# Patient Record
Sex: FEMALE | Race: BLACK OR AFRICAN AMERICAN | NOT HISPANIC OR LATINO | Employment: UNEMPLOYED | URBAN - METROPOLITAN AREA
[De-identification: names, ages, dates, MRNs, and addresses within clinical notes are randomized per-mention and may not be internally consistent; named-entity substitution may affect disease eponyms.]

---

## 2023-06-05 ENCOUNTER — APPOINTMENT (OUTPATIENT)
Dept: LAB | Facility: LAB | Age: 33
End: 2023-06-05
Payer: COMMERCIAL

## 2023-06-05 LAB
ABO GROUP (TYPE) IN BLOOD: NORMAL
ANTIBODY SCREEN: NORMAL
ERYTHROCYTE DISTRIBUTION WIDTH (RATIO) BY AUTOMATED COUNT: 13.4 % (ref 11.5–14.5)
ERYTHROCYTE MEAN CORPUSCULAR HEMOGLOBIN CONCENTRATION (G/DL) BY AUTOMATED: 35.3 G/DL (ref 32–36)
ERYTHROCYTE MEAN CORPUSCULAR VOLUME (FL) BY AUTOMATED COUNT: 88 FL (ref 80–100)
ERYTHROCYTES (10*6/UL) IN BLOOD BY AUTOMATED COUNT: 4.07 X10E12/L (ref 4–5.2)
HEMATOCRIT (%) IN BLOOD BY AUTOMATED COUNT: 36 % (ref 36–46)
HEMOGLOBIN (G/DL) IN BLOOD: 12.7 G/DL (ref 12–16)
HEPATITIS B VIRUS SURFACE AG PRESENCE IN SERUM: NONREACTIVE
HEPATITIS C VIRUS AB PRESENCE IN SERUM: NONREACTIVE
HIV 1/ 2 AG/AB SCREEN: NONREACTIVE
LEUKOCYTES (10*3/UL) IN BLOOD BY AUTOMATED COUNT: 7.1 X10E9/L (ref 4.4–11.3)
NRBC (PER 100 WBCS) BY AUTOMATED COUNT: 0 /100 WBC (ref 0–0)
PLATELETS (10*3/UL) IN BLOOD AUTOMATED COUNT: 236 X10E9/L (ref 150–450)
REFLEX ADDED, ANEMIA PANEL: NORMAL
RH FACTOR: NORMAL
RUBELLA VIRUS IGG AB: POSITIVE
SYPHILIS TOTAL AB: NONREACTIVE

## 2023-06-06 LAB
CHLAMYDIA TRACH., AMPLIFIED: NEGATIVE
N. GONORRHEA, AMPLIFIED: NEGATIVE
TRICHOMONAS VAGINALIS: NEGATIVE
URINE CULTURE: NORMAL

## 2023-06-08 LAB
HEMOGLOBIN A2: 2.9 %
HEMOGLOBIN A: 57.4 %
HEMOGLOBIN F: 0.8 %
HEMOGLOBIN IDENTIFICATION INTERPRETATION: NORMAL
HEMOGLOBIN S: 38.9 %
MISCELLANEUOUS TEST RESULT: NORMAL
NAME OF SENDOUT TEST: NORMAL
PATH REVIEW-HGB IDENTIFICATION: NORMAL

## 2023-09-22 ENCOUNTER — PATIENT OUTREACH (OUTPATIENT)
Dept: CARE COORDINATION | Facility: CLINIC | Age: 33
End: 2023-09-22

## 2023-09-22 ASSESSMENT — EDINBURGH POSTNATAL DEPRESSION SCALE (EPDS)
I HAVE BEEN ANXIOUS OR WORRIED FOR NO GOOD REASON: NO, NOT AT ALL
THINGS HAVE BEEN GETTING ON TOP OF ME: NO, MOST OF THE TIME I HAVE COPED QUITE WELL
TOTAL SCORE: 3
I HAVE FELT SAD OR MISERABLE: NO, NOT AT ALL
I HAVE BEEN SO UNHAPPY THAT I HAVE BEEN CRYING: NO, NEVER
I HAVE BEEN SO UNHAPPY THAT I HAVE HAD DIFFICULTY SLEEPING: NOT AT ALL
I HAVE LOOKED FORWARD WITH ENJOYMENT TO THINGS: AS MUCH AS I EVER DID
I HAVE BLAMED MYSELF UNNECESSARILY WHEN THINGS WENT WRONG: NOT VERY OFTEN
I HAVE BEEN ABLE TO LAUGH AND SEE THE FUNNY SIDE OF THINGS: AS MUCH AS I ALWAYS COULD
I HAVE FELT SCARED OR PANICKY FOR NO GOOD REASON: NO, NOT MUCH
THE THOUGHT OF HARMING MYSELF HAS OCCURRED TO ME: NEVER

## 2023-09-25 ENCOUNTER — LAB (OUTPATIENT)
Dept: LAB | Facility: LAB | Age: 33
End: 2023-09-25
Payer: COMMERCIAL

## 2023-09-25 LAB
ERYTHROCYTE DISTRIBUTION WIDTH (RATIO) BY AUTOMATED COUNT: 12.9 % (ref 11.5–14.5)
ERYTHROCYTE MEAN CORPUSCULAR HEMOGLOBIN CONCENTRATION (G/DL) BY AUTOMATED: 33.6 G/DL (ref 32–36)
ERYTHROCYTE MEAN CORPUSCULAR VOLUME (FL) BY AUTOMATED COUNT: 96 FL (ref 80–100)
ERYTHROCYTES (10*6/UL) IN BLOOD BY AUTOMATED COUNT: 3.42 X10E12/L (ref 4–5.2)
GLUCOSE, 1 HR SCREEN, PREG: 63 MG/DL
HEMATOCRIT (%) IN BLOOD BY AUTOMATED COUNT: 33 % (ref 36–46)
HEMOGLOBIN (G/DL) IN BLOOD: 11.1 G/DL (ref 12–16)
LEUKOCYTES (10*3/UL) IN BLOOD BY AUTOMATED COUNT: 9.2 X10E9/L (ref 4.4–11.3)
NRBC (PER 100 WBCS) BY AUTOMATED COUNT: 0 /100 WBC (ref 0–0)
PLATELETS (10*3/UL) IN BLOOD AUTOMATED COUNT: 181 X10E9/L (ref 150–450)
REFLEX ADDED, ANEMIA PANEL: ABNORMAL
SYPHILIS TOTAL AB: NONREACTIVE

## 2023-10-09 ENCOUNTER — PATIENT OUTREACH (OUTPATIENT)
Dept: CARE COORDINATION | Facility: CLINIC | Age: 33
End: 2023-10-09
Payer: COMMERCIAL

## 2023-10-23 ENCOUNTER — ROUTINE PRENATAL (OUTPATIENT)
Dept: OBSTETRICS AND GYNECOLOGY | Facility: HOSPITAL | Age: 33
End: 2023-10-23
Payer: COMMERCIAL

## 2023-10-23 VITALS — SYSTOLIC BLOOD PRESSURE: 118 MMHG | DIASTOLIC BLOOD PRESSURE: 76 MMHG | WEIGHT: 197 LBS | BODY MASS INDEX: 31.8 KG/M2

## 2023-10-23 DIAGNOSIS — Z3A.29 29 WEEKS GESTATION OF PREGNANCY (HHS-HCC): ICD-10-CM

## 2023-10-23 DIAGNOSIS — Z23 NEED FOR TDAP VACCINATION: ICD-10-CM

## 2023-10-23 PROCEDURE — 99213 OFFICE O/P EST LOW 20 MIN: CPT

## 2023-10-23 PROCEDURE — 90471 IMMUNIZATION ADMIN: CPT

## 2023-10-23 PROCEDURE — 99213 OFFICE O/P EST LOW 20 MIN: CPT | Mod: TH,25

## 2023-10-23 PROCEDURE — 90472 IMMUNIZATION ADMIN EACH ADD: CPT

## 2023-10-23 RX ORDER — PRENATAL VIT 75/IRON/FOLIC/OM3 28-800-440
COMBINATION PACKAGE (EA) ORAL
COMMUNITY

## 2023-10-23 ASSESSMENT — ENCOUNTER SYMPTOMS
GASTROINTESTINAL NEGATIVE: 0
NEUROLOGICAL NEGATIVE: 0
ENDOCRINE NEGATIVE: 0
MUSCULOSKELETAL NEGATIVE: 0
EYES NEGATIVE: 0
HEMATOLOGIC/LYMPHATIC NEGATIVE: 0
RESPIRATORY NEGATIVE: 0
PSYCHIATRIC NEGATIVE: 0
CONSTITUTIONAL NEGATIVE: 0
CARDIOVASCULAR NEGATIVE: 0
ALLERGIC/IMMUNOLOGIC NEGATIVE: 0

## 2023-10-23 NOTE — PROGRESS NOTES
Assessment/Plan   Problem List Items Addressed This Visit    None  Visit Diagnoses         Codes    Need for Tdap vaccination     Z23    Relevant Orders    Tdap vaccine, age 7 years and older  (BOOSTRIX)    29 weeks gestation of pregnancy     Z3A.29    Relevant Orders    Flu vaccine (IIV4) age 6 months and greater, preservative free          No other concerns today  Reviewed s/sx of PTL, warning signs, fetal movement counts, and when to call provider  Follow up in 2 week for a routine prenatal visit.    Caryn Mercado, BERTHA-LEANDER    Subjective     Nathalie Mendez is a 32 y.o.  at 29w4d with a working estimated date of delivery of 2024, by Last Menstrual Period who presents for a routine prenatal visit. She denies vaginal bleeding, leakage of fluid, decreased fetal movements, or contractions.    Her pregnancy is complicated by:  Adjustment disorder with mixed anxiety and depressed mood 2023 05:57 PM - AS   Segmental and somatic dysfunction of lumbar region 2023 09:08 AM - BB   Segmental and somatic dysfunction of pelvic region 2023 09:08 AM - BB   Segmental and somatic dysfunction of thoracic region 2023 09:08 AM - BB   Sickle cell trait 2015 04:00 PM - GM   Same FOB 2019 02:48 PM - DL   FOB SC Trait (-) 2015 01:49 PM - GM    Objective   Physical Exam:   Weight: 89.4 kg (197 lb)  TW.7 kg (39 lb)  Expected Total Weight Gain: 7 kg (15 lb)-11.5 kg (25 lb)   Pregravid BMI: 25.51  BP: 118/76  Fetal Heart Rate: 135 Fundal Height (cm): 30 cm             Postpartum Depression: Low Risk  (2023)    [place]  Depression Scale     Last EPDS Total Score: 3     Last EPDS Self Harm Result: Never        Prenatal Labs  Lab Results   Component Value Date    HGB 11.1 (L) 2023    HCT 33.0 (L) 2023     2023    ABO A 2023    LABRH POS 2023    NEISSGONOAMP NEGATIVE 2023    CHLAMTRACAMP NEGATIVE 2023    SYPHT  NONREACTIVE 09/25/2023    HEPBSAG NONREACTIVE 06/05/2023    HIV1X2 NONREACTIVE 06/05/2023    URINECULTURE MIXED URETHRAL RHONDA. 06/05/2023     Lab Results   Component Value Date    GLUC1P 63 09/25/2023

## 2023-11-08 ENCOUNTER — ROUTINE PRENATAL (OUTPATIENT)
Dept: OBSTETRICS AND GYNECOLOGY | Facility: HOSPITAL | Age: 33
End: 2023-11-08
Payer: COMMERCIAL

## 2023-11-08 VITALS — SYSTOLIC BLOOD PRESSURE: 114 MMHG | WEIGHT: 200 LBS | BODY MASS INDEX: 32.28 KG/M2 | DIASTOLIC BLOOD PRESSURE: 75 MMHG

## 2023-11-08 DIAGNOSIS — Z3A.29 29 WEEKS GESTATION OF PREGNANCY (HHS-HCC): ICD-10-CM

## 2023-11-08 DIAGNOSIS — Z3A.31 31 WEEKS GESTATION OF PREGNANCY (HHS-HCC): Primary | ICD-10-CM

## 2023-11-08 PROCEDURE — 99213 OFFICE O/P EST LOW 20 MIN: CPT | Mod: TH

## 2023-11-08 PROCEDURE — 99213 OFFICE O/P EST LOW 20 MIN: CPT

## 2023-11-08 ASSESSMENT — ENCOUNTER SYMPTOMS
NEUROLOGICAL NEGATIVE: 0
GASTROINTESTINAL NEGATIVE: 0
HEMATOLOGIC/LYMPHATIC NEGATIVE: 0
PSYCHIATRIC NEGATIVE: 0
MUSCULOSKELETAL NEGATIVE: 0
RESPIRATORY NEGATIVE: 0
ENDOCRINE NEGATIVE: 0
CARDIOVASCULAR NEGATIVE: 0
CONSTITUTIONAL NEGATIVE: 0
ALLERGIC/IMMUNOLOGIC NEGATIVE: 0
EYES NEGATIVE: 0

## 2023-11-08 NOTE — PROGRESS NOTES
Assessment/Plan   Problem List Items Addressed This Visit    None  Visit Diagnoses         Codes    31 weeks gestation of pregnancy    -  Primary Z3A.31    Relevant Orders    Follow Up In Obstetrics   No concerns today  Reviewed s/sx of PTL, warning signs, fetal movement counts, and when to call provider  Follow up in 2 weeks for a routine prenatal visit.    Caryn Mercado, BERTHA-LEANDER    Subjective     Nathalie Mendez is a 32 y.o.  at 31w6d with a working estimated date of delivery of 2024, by Last Menstrual Period who presents for a routine prenatal visit. She denies vaginal bleeding, leakage of fluid, decreased fetal movements, or contractions.    Her pregnancy is complicated by:  Sickle cell trait  Anxiety/depression     Objective   Physical Exam:   Weight: 90.7 kg (200 lb)  TW.1 kg (42 lb)  Expected Total Weight Gain: 7 kg (15 lb)-11.5 kg (25 lb)   Pregravid BMI: 25.51  BP: 114/75  Fetal Heart Rate: 140 Fundal Height (cm): 31 cm Presentation: Vertex           Postpartum Depression: Low Risk  (2023)    [place]  Depression Scale     Last EPDS Total Score: 3     Last EPDS Self Harm Result: Never        Prenatal Labs  Lab Results   Component Value Date    HGB 11.1 (L) 2023    HCT 33.0 (L) 2023     2023    ABO A 2023    LABRH POS 2023    NEISSGONOAMP NEGATIVE 2023    CHLAMTRACAMP NEGATIVE 2023    SYPHT NONREACTIVE 2023    HEPBSAG NONREACTIVE 2023    HIV1X2 NONREACTIVE 2023    URINECULTURE MIXED URETHRAL RHONDA. 2023     Lab Results   Component Value Date    GLUC1P 63 2023

## 2023-11-22 ENCOUNTER — ROUTINE PRENATAL (OUTPATIENT)
Dept: OBSTETRICS AND GYNECOLOGY | Facility: HOSPITAL | Age: 33
End: 2023-11-22
Payer: COMMERCIAL

## 2023-11-22 VITALS — BODY MASS INDEX: 33.25 KG/M2 | DIASTOLIC BLOOD PRESSURE: 68 MMHG | SYSTOLIC BLOOD PRESSURE: 116 MMHG | WEIGHT: 206 LBS

## 2023-11-22 DIAGNOSIS — Z3A.33 33 WEEKS GESTATION OF PREGNANCY (HHS-HCC): Primary | ICD-10-CM

## 2023-11-22 DIAGNOSIS — Z3A.31 31 WEEKS GESTATION OF PREGNANCY (HHS-HCC): ICD-10-CM

## 2023-11-22 PROCEDURE — 99213 OFFICE O/P EST LOW 20 MIN: CPT | Mod: TH

## 2023-11-22 PROCEDURE — 99213 OFFICE O/P EST LOW 20 MIN: CPT

## 2023-11-22 ASSESSMENT — ENCOUNTER SYMPTOMS
GASTROINTESTINAL NEGATIVE: 0
MUSCULOSKELETAL NEGATIVE: 0
RESPIRATORY NEGATIVE: 0
CONSTITUTIONAL NEGATIVE: 0
PSYCHIATRIC NEGATIVE: 0
EYES NEGATIVE: 0
ALLERGIC/IMMUNOLOGIC NEGATIVE: 0
ENDOCRINE NEGATIVE: 0
NEUROLOGICAL NEGATIVE: 0
HEMATOLOGIC/LYMPHATIC NEGATIVE: 0
CARDIOVASCULAR NEGATIVE: 0

## 2023-11-22 NOTE — PROGRESS NOTES
Assessment/Plan   Problem List Items Addressed This Visit    None  Visit Diagnoses         Codes    33 weeks gestation of pregnancy    -  Primary Z3A.33    Relevant Orders    Follow Up In Obstetrics    US MAC OB imaging order     Discussed routine GBS screening, to be completed next visit   surveillance weekly for BMI  No other concerns today  Reviewed s/sx of PTL, warning signs, fetal movement counts, and when to call provider  Follow up in 2 weeks for a routine prenatal visit.    Caryn Mercado, BERTHA-LEANDER    Subjective     Nathalie Mendez is a 33 y.o.  at 33w6d with a working estimated date of delivery of 2024, by Last Menstrual Period who presents for a routine prenatal visit. She denies vaginal bleeding, leakage of fluid, decreased fetal movements, or contractions.    Her pregnancy is complicated by:  Pregnancy Problems (from 23 to present)       No problems associated with this episode.             Objective   Physical Exam:   Weight: 93.4 kg (206 lb)  TW.8 kg (48 lb)  Expected Total Weight Gain: 7 kg (15 lb)-11.5 kg (25 lb)   Pregravid BMI: 25.51  BP: 116/68  Fetal Heart Rate: 135 Fundal Height (cm): 34 cm Presentation: Vertex           Postpartum Depression: Low Risk  (2023)    [place]  Depression Scale     Last EPDS Total Score: 3     Last EPDS Self Harm Result: Never        Prenatal Labs  Lab Results   Component Value Date    HGB 11.1 (L) 2023    HCT 33.0 (L) 2023     2023    ABO A 2023    LABRH POS 2023    NEISSGONOAMP NEGATIVE 2023    CHLAMTRACAMP NEGATIVE 2023    SYPHT NONREACTIVE 2023    HEPBSAG NONREACTIVE 2023    HIV1X2 NONREACTIVE 2023    URINECULTURE MIXED URETHRAL RHONDA. 2023     Lab Results   Component Value Date    GLUC1P 63 2023

## 2023-12-07 ENCOUNTER — APPOINTMENT (OUTPATIENT)
Dept: NUTRITION | Facility: HOSPITAL | Age: 33
End: 2023-12-07
Payer: COMMERCIAL

## 2023-12-11 ENCOUNTER — ROUTINE PRENATAL (OUTPATIENT)
Dept: OBSTETRICS AND GYNECOLOGY | Facility: HOSPITAL | Age: 33
End: 2023-12-11
Payer: COMMERCIAL

## 2023-12-11 ENCOUNTER — HOSPITAL ENCOUNTER (OUTPATIENT)
Dept: RADIOLOGY | Facility: HOSPITAL | Age: 33
Discharge: HOME | End: 2023-12-11
Payer: COMMERCIAL

## 2023-12-11 VITALS — DIASTOLIC BLOOD PRESSURE: 76 MMHG | SYSTOLIC BLOOD PRESSURE: 117 MMHG

## 2023-12-11 DIAGNOSIS — Z3A.33 33 WEEKS GESTATION OF PREGNANCY (HHS-HCC): ICD-10-CM

## 2023-12-11 DIAGNOSIS — Z3A.36 36 WEEKS GESTATION OF PREGNANCY (HHS-HCC): Primary | ICD-10-CM

## 2023-12-11 PROCEDURE — 99213 OFFICE O/P EST LOW 20 MIN: CPT

## 2023-12-11 PROCEDURE — 87081 CULTURE SCREEN ONLY: CPT

## 2023-12-11 PROCEDURE — 99213 OFFICE O/P EST LOW 20 MIN: CPT | Mod: TH

## 2023-12-11 PROCEDURE — 76816 OB US FOLLOW-UP PER FETUS: CPT

## 2023-12-11 PROCEDURE — 76816 OB US FOLLOW-UP PER FETUS: CPT | Performed by: OBSTETRICS & GYNECOLOGY

## 2023-12-11 NOTE — PROGRESS NOTES
Assessment/Plan   Problem List Items Addressed This Visit    None  Visit Diagnoses         Codes    36 weeks gestation of pregnancy    -  Primary Z3A.36    Relevant Orders    Group B Streptococcus (GBS) Prenatal Screen, Culture    Follow Up In Obstetrics     Discussed routine GBS screening, to be completed today  No other concerns today  Reviewed s/sx of PTL, warning signs, fetal movement counts, and when to call provider  Follow up in 1 week for a routine prenatal visit.    Caryn Mercado, BERTHA-LEANDER    Subjective     Nathalie Mendez is a 33 y.o.  at 36w4d with a working estimated date of delivery of 2024, by Last Menstrual Period who presents for a routine prenatal visit. She denies vaginal bleeding, leakage of fluid, decreased fetal movements, or contractions.    Her pregnancy is complicated by:  Sickle cell trait    Objective   Physical Exam:      TW.8 kg (48 lb)  Expected Total Weight Gain: 7 kg (15 lb)-11.5 kg (25 lb)   Pregravid BMI: 25.51  BP: 117/76  Fetal Heart Rate: 145 Fundal Height (cm): 37 cm Presentation: Vertex  Dilation: 1 Effacement (%): 50 Fetal Station: -3    Postpartum Depression: Low Risk  (2023)    [place]  Depression Scale     Last EPDS Total Score: 3     Last EPDS Self Harm Result: Never        Prenatal Labs  Lab Results   Component Value Date    HGB 11.1 (L) 2023    HCT 33.0 (L) 2023     2023    ABO A 2023    LABRH POS 2023    NEISSGONOAMP NEGATIVE 2023    CHLAMTRACAMP NEGATIVE 2023    SYPHT NONREACTIVE 2023    HEPBSAG NONREACTIVE 2023    HIV1X2 NONREACTIVE 2023    URINECULTURE MIXED URETHRAL RHONDA. 2023     Lab Results   Component Value Date    GLUC1P 63 2023

## 2023-12-13 LAB — GP B STREP GENITAL QL CULT: ABNORMAL

## 2023-12-15 ENCOUNTER — APPOINTMENT (OUTPATIENT)
Dept: NUTRITION | Facility: HOSPITAL | Age: 33
End: 2023-12-15
Payer: COMMERCIAL

## 2023-12-20 ENCOUNTER — ROUTINE PRENATAL (OUTPATIENT)
Dept: OBSTETRICS AND GYNECOLOGY | Facility: HOSPITAL | Age: 33
End: 2023-12-20
Payer: COMMERCIAL

## 2023-12-20 VITALS — SYSTOLIC BLOOD PRESSURE: 123 MMHG | DIASTOLIC BLOOD PRESSURE: 78 MMHG | BODY MASS INDEX: 34.06 KG/M2 | WEIGHT: 211 LBS

## 2023-12-20 DIAGNOSIS — Z3A.36 36 WEEKS GESTATION OF PREGNANCY (HHS-HCC): ICD-10-CM

## 2023-12-20 DIAGNOSIS — Z3A.37 37 WEEKS GESTATION OF PREGNANCY (HHS-HCC): Primary | ICD-10-CM

## 2023-12-20 PROCEDURE — 99213 OFFICE O/P EST LOW 20 MIN: CPT | Mod: TH

## 2023-12-20 PROCEDURE — 99213 OFFICE O/P EST LOW 20 MIN: CPT

## 2023-12-20 NOTE — PROGRESS NOTES
Assessment/Plan   Problem List Items Addressed This Visit    None  Visit Diagnoses         Codes    37 weeks gestation of pregnancy    -  Primary Z3A.37    Relevant Orders    Follow Up In Obstetrics     Complaints of increasing pelvic pressure and contractions are becoming more consistent; SVE unchanged.  No other concerns today  Reviewed s/sx of labor, warning signs, fetal movement counts, and when to call provider  Follow up in 1 week for a routine prenatal visit.    Caryn Mercado, APRN-CN    Subjective     Nathalie Mendez is a 33 y.o.  at 37w6d with a working estimated date of delivery of 2024, by Last Menstrual Period who presents for a routine prenatal visit. She denies vaginal bleeding, leakage of fluid, decreased fetal movements, or contractions.    Her pregnancy is complicated by:  Pregnancy Problems (from 23 to present)       No problems associated with this episode.             Objective   Physical Exam:   Weight: 95.7 kg (211 lb)  TW kg (53 lb)  Expected Total Weight Gain: 7 kg (15 lb)-11.5 kg (25 lb)   Pregravid BMI: 25.51  BP: 123/78  Fetal Heart Rate: 140 Fundal Height (cm): 39 cm Presentation: Vertex  Dilation: 1 Effacement (%): 50 Fetal Station: -3    Postpartum Depression: Low Risk  (2023)    [place]  Depression Scale     Last EPDS Total Score: 3     Last EPDS Self Harm Result: Never        Prenatal Labs  Lab Results   Component Value Date    HGB 11.1 (L) 2023    HCT 33.0 (L) 2023     2023    ABO A 2023    LABRH POS 2023    NEISSGONOAMP NEGATIVE 2023    CHLAMTRACAMP NEGATIVE 2023    SYPHT NONREACTIVE 2023    HEPBSAG NONREACTIVE 2023    HIV1X2 NONREACTIVE 2023    URINECULTURE MIXED URETHRAL RHONDA. 2023     Lab Results   Component Value Date    GLUC1P 63 2023     Group B Strep Screen   Date Value Ref Range Status   2023 (A)  Final    Isolated: Streptococcus agalactiae  (Group B Streptococcus)

## 2023-12-26 PROBLEM — D57.3 SICKLE CELL TRAIT (CMS-HCC): Status: ACTIVE | Noted: 2023-12-26

## 2023-12-26 PROBLEM — A49.1 GROUP B STREPTOCOCCAL INFECTION: Status: ACTIVE | Noted: 2023-12-26

## 2023-12-26 PROBLEM — Z34.80 SUPERVISION OF OTHER NORMAL PREGNANCY, ANTEPARTUM (HHS-HCC): Status: ACTIVE | Noted: 2023-12-26

## 2023-12-26 NOTE — PROGRESS NOTES
.Subjective     Nathalie Mendez is a 33 y.o.  at 39w1d with a working estimated date of delivery of 2024, by Last Menstrual Period who presents for a routine prenatal visit.     She denies vaginal bleeding, leakage of fluid, decreased fetal movements, or contractions. Tired of being pregnant though.     Patient would like IOL after 40weeks. Patient would like SVE and membrane stripping today.      Objective   Physical Exam:   Weight: 97.1 kg (214 lb)  Expected Total Weight Gain: 7 kg (15 lb)-11.5 kg (25 lb)   Pregravid BMI: 25.51  BP: 131/63  Fetal Heart Rate: 130 Fundal Height (cm): 38 cm Presentation: Vertex  Dilation: 1.5 Effacement (%): 60 Fetal Station: -3, soft, posterior     Problem List Items Addressed This Visit       Supervision of other normal pregnancy, antepartum    Overview     Desired provider in labor: [] CNM  [] Physician  [x] Dated by: LMP   [x] Initial BMI: 25  [x] Prenatal Labs: WNL   [x] Rh status: A+  [] Genetic Screening:    [x] Baby ASA: not on     [x] Anatomy US: 8/10/23 WNL  [x] Fetal Sex: female  [] Patient added to BirthTracks  [x] 1hr GCT at 24-28wks: 63    [x] Tdap (27-36wks): 10/23/23  [x] Flu Shot: 10/23/23  [] COVID vaccine:     [x] Breastfeeding  [x] Pain management during labor: natura  [x] Postpartum Birth control method: Paragard   [x] support: FOB, mother-in-law  [x] GBS at 36 wks: positive                Group B streptococcal infection    Overview     GBS+   Please treat in labor          Sickle cell trait (CMS/HCC)    Overview     -1st tri urine culture WNL  -3rd tri urine :           Other Visit Diagnoses       39 weeks gestation of pregnancy    -  Primary    Relevant Orders    Urine Culture    37 weeks gestation of pregnancy                Coping mechanisms and pain management options during labor discussed, patient wants to go natural  Postpartum contraception options discussed, Paragard at 6 week  Discussed routine GBS screening, that she is GBS+  Discussed  expectant management vs. scheduling term IOL, RN flagged about 40w IOL  Aragon score = 5  Reviewed s/sx of labor, warning signs, fetal movement counts, and when to call provider    ADRIANA Quigley

## 2023-12-29 ENCOUNTER — TELEPHONE (OUTPATIENT)
Dept: OBSTETRICS AND GYNECOLOGY | Facility: HOSPITAL | Age: 33
End: 2023-12-29

## 2023-12-29 ENCOUNTER — ROUTINE PRENATAL (OUTPATIENT)
Dept: OBSTETRICS AND GYNECOLOGY | Facility: HOSPITAL | Age: 33
End: 2023-12-29
Payer: COMMERCIAL

## 2023-12-29 ENCOUNTER — APPOINTMENT (OUTPATIENT)
Dept: NUTRITION | Facility: HOSPITAL | Age: 33
End: 2023-12-29
Payer: COMMERCIAL

## 2023-12-29 VITALS — WEIGHT: 214 LBS | BODY MASS INDEX: 34.54 KG/M2 | DIASTOLIC BLOOD PRESSURE: 63 MMHG | SYSTOLIC BLOOD PRESSURE: 131 MMHG

## 2023-12-29 DIAGNOSIS — D57.3 SICKLE CELL TRAIT (CMS-HCC): ICD-10-CM

## 2023-12-29 DIAGNOSIS — Z34.80 SUPERVISION OF OTHER NORMAL PREGNANCY, ANTEPARTUM (HHS-HCC): ICD-10-CM

## 2023-12-29 DIAGNOSIS — Z3A.39 39 WEEKS GESTATION OF PREGNANCY (HHS-HCC): Primary | ICD-10-CM

## 2023-12-29 DIAGNOSIS — Z3A.37 37 WEEKS GESTATION OF PREGNANCY (HHS-HCC): ICD-10-CM

## 2023-12-29 DIAGNOSIS — A49.1 GROUP B STREPTOCOCCAL INFECTION: ICD-10-CM

## 2023-12-29 PROCEDURE — 99213 OFFICE O/P EST LOW 20 MIN: CPT | Performed by: ADVANCED PRACTICE MIDWIFE

## 2023-12-29 PROCEDURE — 99213 OFFICE O/P EST LOW 20 MIN: CPT | Mod: TH | Performed by: ADVANCED PRACTICE MIDWIFE

## 2023-12-29 PROCEDURE — 87086 URINE CULTURE/COLONY COUNT: CPT | Performed by: ADVANCED PRACTICE MIDWIFE

## 2023-12-29 NOTE — TELEPHONE ENCOUNTER
Patient scheduled for term IOL at 40.2 wga on 1/6/24 1400  Patient aware of date and time to arrive to L&D  Induction letter handed to patient as she was still in office  Encouraged patient to call office with any questions or concerns  Labor induction order put in by provider  Avelina Ortega RN

## 2023-12-30 LAB — BACTERIA UR CULT: NORMAL

## 2024-01-03 ENCOUNTER — HOSPITAL ENCOUNTER (INPATIENT)
Facility: HOSPITAL | Age: 34
LOS: 2 days | Discharge: HOME | End: 2024-01-05
Attending: OBSTETRICS & GYNECOLOGY
Payer: COMMERCIAL

## 2024-01-03 PROBLEM — Z37.9 NORMAL LABOR (HHS-HCC): Status: ACTIVE | Noted: 2024-01-03

## 2024-01-03 LAB
ABO GROUP (TYPE) IN BLOOD: NORMAL
ANTIBODY SCREEN: NORMAL
ERYTHROCYTE [DISTWIDTH] IN BLOOD BY AUTOMATED COUNT: 13.7 % (ref 11.5–14.5)
HCT VFR BLD AUTO: 37.4 % (ref 36–46)
HGB BLD-MCNC: 12.7 G/DL (ref 12–16)
MCH RBC QN AUTO: 30.8 PG (ref 26–34)
MCHC RBC AUTO-ENTMCNC: 34 G/DL (ref 32–36)
MCV RBC AUTO: 91 FL (ref 80–100)
NRBC BLD-RTO: 0 /100 WBCS (ref 0–0)
PLATELET # BLD AUTO: 178 X10*3/UL (ref 150–450)
RBC # BLD AUTO: 4.12 X10*6/UL (ref 4–5.2)
RH FACTOR (ANTIGEN D): NORMAL
T PALLIDUM AB SER QL: NONREACTIVE
WBC # BLD AUTO: 9.9 X10*3/UL (ref 4.4–11.3)

## 2024-01-03 PROCEDURE — 2500000004 HC RX 250 GENERAL PHARMACY W/ HCPCS (ALT 636 FOR OP/ED)

## 2024-01-03 PROCEDURE — 86901 BLOOD TYPING SEROLOGIC RH(D): CPT

## 2024-01-03 PROCEDURE — 86780 TREPONEMA PALLIDUM: CPT

## 2024-01-03 PROCEDURE — 59409 OBSTETRICAL CARE: CPT

## 2024-01-03 PROCEDURE — 85027 COMPLETE CBC AUTOMATED: CPT

## 2024-01-03 PROCEDURE — 36415 COLL VENOUS BLD VENIPUNCTURE: CPT

## 2024-01-03 PROCEDURE — 2500000001 HC RX 250 WO HCPCS SELF ADMINISTERED DRUGS (ALT 637 FOR MEDICARE OP)

## 2024-01-03 PROCEDURE — 1100000001 HC PRIVATE ROOM DAILY

## 2024-01-03 RX ORDER — LOPERAMIDE HYDROCHLORIDE 2 MG/1
4 CAPSULE ORAL EVERY 2 HOUR PRN
Status: DISCONTINUED | OUTPATIENT
Start: 2024-01-03 | End: 2024-01-05 | Stop reason: HOSPADM

## 2024-01-03 RX ORDER — ONDANSETRON HYDROCHLORIDE 2 MG/ML
4 INJECTION, SOLUTION INTRAVENOUS EVERY 6 HOURS PRN
Status: DISCONTINUED | OUTPATIENT
Start: 2024-01-03 | End: 2024-01-05 | Stop reason: HOSPADM

## 2024-01-03 RX ORDER — DIPHENHYDRAMINE HYDROCHLORIDE 50 MG/ML
25 INJECTION INTRAMUSCULAR; INTRAVENOUS EVERY 6 HOURS PRN
Status: DISCONTINUED | OUTPATIENT
Start: 2024-01-03 | End: 2024-01-05 | Stop reason: HOSPADM

## 2024-01-03 RX ORDER — OXYTOCIN 10 [USP'U]/ML
10 INJECTION, SOLUTION INTRAMUSCULAR; INTRAVENOUS ONCE AS NEEDED
Status: DISCONTINUED | OUTPATIENT
Start: 2024-01-03 | End: 2024-01-05 | Stop reason: HOSPADM

## 2024-01-03 RX ORDER — SIMETHICONE 80 MG
80 TABLET,CHEWABLE ORAL 4 TIMES DAILY PRN
Status: DISCONTINUED | OUTPATIENT
Start: 2024-01-03 | End: 2024-01-05 | Stop reason: HOSPADM

## 2024-01-03 RX ORDER — OXYTOCIN/0.9 % SODIUM CHLORIDE 30/500 ML
60 PLASTIC BAG, INJECTION (ML) INTRAVENOUS ONCE AS NEEDED
Status: DISCONTINUED | OUTPATIENT
Start: 2024-01-03 | End: 2024-01-05 | Stop reason: HOSPADM

## 2024-01-03 RX ORDER — MISOPROSTOL 200 UG/1
800 TABLET ORAL ONCE AS NEEDED
Status: DISCONTINUED | OUTPATIENT
Start: 2024-01-03 | End: 2024-01-05 | Stop reason: HOSPADM

## 2024-01-03 RX ORDER — LIDOCAINE 560 MG/1
1 PATCH PERCUTANEOUS; TOPICAL; TRANSDERMAL
Status: DISCONTINUED | OUTPATIENT
Start: 2024-01-03 | End: 2024-01-05 | Stop reason: HOSPADM

## 2024-01-03 RX ORDER — CARBOPROST TROMETHAMINE 250 UG/ML
250 INJECTION, SOLUTION INTRAMUSCULAR ONCE AS NEEDED
Status: DISCONTINUED | OUTPATIENT
Start: 2024-01-03 | End: 2024-01-03

## 2024-01-03 RX ORDER — POLYETHYLENE GLYCOL 3350 17 G/17G
17 POWDER, FOR SOLUTION ORAL 2 TIMES DAILY PRN
Status: DISCONTINUED | OUTPATIENT
Start: 2024-01-03 | End: 2024-01-05 | Stop reason: HOSPADM

## 2024-01-03 RX ORDER — NIFEDIPINE 10 MG/1
10 CAPSULE ORAL ONCE AS NEEDED
Status: DISCONTINUED | OUTPATIENT
Start: 2024-01-03 | End: 2024-01-05 | Stop reason: HOSPADM

## 2024-01-03 RX ORDER — LOPERAMIDE HYDROCHLORIDE 2 MG/1
4 CAPSULE ORAL EVERY 2 HOUR PRN
Status: DISCONTINUED | OUTPATIENT
Start: 2024-01-03 | End: 2024-01-03

## 2024-01-03 RX ORDER — OXYTOCIN/0.9 % SODIUM CHLORIDE 30/500 ML
PLASTIC BAG, INJECTION (ML) INTRAVENOUS
Status: COMPLETED
Start: 2024-01-03 | End: 2024-01-03

## 2024-01-03 RX ORDER — CARBOPROST TROMETHAMINE 250 UG/ML
250 INJECTION, SOLUTION INTRAMUSCULAR ONCE AS NEEDED
Status: DISCONTINUED | OUTPATIENT
Start: 2024-01-03 | End: 2024-01-05 | Stop reason: HOSPADM

## 2024-01-03 RX ORDER — METOCLOPRAMIDE 10 MG/1
10 TABLET ORAL EVERY 6 HOURS PRN
Status: DISCONTINUED | OUTPATIENT
Start: 2024-01-03 | End: 2024-01-03

## 2024-01-03 RX ORDER — BISACODYL 10 MG/1
10 SUPPOSITORY RECTAL DAILY PRN
Status: DISCONTINUED | OUTPATIENT
Start: 2024-01-03 | End: 2024-01-05 | Stop reason: HOSPADM

## 2024-01-03 RX ORDER — LABETALOL HYDROCHLORIDE 5 MG/ML
20 INJECTION, SOLUTION INTRAVENOUS ONCE AS NEEDED
Status: DISCONTINUED | OUTPATIENT
Start: 2024-01-03 | End: 2024-01-05 | Stop reason: HOSPADM

## 2024-01-03 RX ORDER — TRANEXAMIC ACID 100 MG/ML
1000 INJECTION, SOLUTION INTRAVENOUS ONCE AS NEEDED
Status: DISCONTINUED | OUTPATIENT
Start: 2024-01-03 | End: 2024-01-03

## 2024-01-03 RX ORDER — METHYLERGONOVINE MALEATE 0.2 MG/ML
0.2 INJECTION INTRAVENOUS ONCE AS NEEDED
Status: DISCONTINUED | OUTPATIENT
Start: 2024-01-03 | End: 2024-01-03

## 2024-01-03 RX ORDER — IBUPROFEN 600 MG/1
600 TABLET ORAL EVERY 6 HOURS
Status: DISCONTINUED | OUTPATIENT
Start: 2024-01-03 | End: 2024-01-05 | Stop reason: HOSPADM

## 2024-01-03 RX ORDER — LIDOCAINE HYDROCHLORIDE 10 MG/ML
30 INJECTION INFILTRATION; PERINEURAL ONCE AS NEEDED
Status: DISCONTINUED | OUTPATIENT
Start: 2024-01-03 | End: 2024-01-03

## 2024-01-03 RX ORDER — HYDRALAZINE HYDROCHLORIDE 20 MG/ML
5 INJECTION INTRAMUSCULAR; INTRAVENOUS ONCE AS NEEDED
Status: DISCONTINUED | OUTPATIENT
Start: 2024-01-03 | End: 2024-01-05 | Stop reason: HOSPADM

## 2024-01-03 RX ORDER — MISOPROSTOL 200 UG/1
800 TABLET ORAL ONCE AS NEEDED
Status: DISCONTINUED | OUTPATIENT
Start: 2024-01-03 | End: 2024-01-03

## 2024-01-03 RX ORDER — OXYTOCIN/0.9 % SODIUM CHLORIDE 30/500 ML
60 PLASTIC BAG, INJECTION (ML) INTRAVENOUS ONCE AS NEEDED
Status: COMPLETED | OUTPATIENT
Start: 2024-01-03 | End: 2024-01-03

## 2024-01-03 RX ORDER — SODIUM CHLORIDE, SODIUM LACTATE, POTASSIUM CHLORIDE, CALCIUM CHLORIDE 600; 310; 30; 20 MG/100ML; MG/100ML; MG/100ML; MG/100ML
125 INJECTION, SOLUTION INTRAVENOUS CONTINUOUS
Status: DISCONTINUED | OUTPATIENT
Start: 2024-01-03 | End: 2024-01-03

## 2024-01-03 RX ORDER — ADHESIVE BANDAGE
10 BANDAGE TOPICAL
Status: DISCONTINUED | OUTPATIENT
Start: 2024-01-03 | End: 2024-01-05 | Stop reason: HOSPADM

## 2024-01-03 RX ORDER — DIPHENHYDRAMINE HCL 25 MG
25 CAPSULE ORAL EVERY 6 HOURS PRN
Status: DISCONTINUED | OUTPATIENT
Start: 2024-01-03 | End: 2024-01-05 | Stop reason: HOSPADM

## 2024-01-03 RX ORDER — ACETAMINOPHEN 325 MG/1
975 TABLET ORAL EVERY 6 HOURS
Status: DISCONTINUED | OUTPATIENT
Start: 2024-01-03 | End: 2024-01-05 | Stop reason: HOSPADM

## 2024-01-03 RX ORDER — METOCLOPRAMIDE HYDROCHLORIDE 5 MG/ML
10 INJECTION INTRAMUSCULAR; INTRAVENOUS EVERY 6 HOURS PRN
Status: DISCONTINUED | OUTPATIENT
Start: 2024-01-03 | End: 2024-01-03

## 2024-01-03 RX ORDER — OXYTOCIN 10 [USP'U]/ML
10 INJECTION, SOLUTION INTRAMUSCULAR; INTRAVENOUS ONCE AS NEEDED
Status: DISCONTINUED | OUTPATIENT
Start: 2024-01-03 | End: 2024-01-03

## 2024-01-03 RX ORDER — TERBUTALINE SULFATE 1 MG/ML
0.25 INJECTION SUBCUTANEOUS ONCE AS NEEDED
Status: DISCONTINUED | OUTPATIENT
Start: 2024-01-03 | End: 2024-01-03

## 2024-01-03 RX ORDER — NIFEDIPINE 10 MG/1
10 CAPSULE ORAL ONCE AS NEEDED
Status: DISCONTINUED | OUTPATIENT
Start: 2024-01-03 | End: 2024-01-03

## 2024-01-03 RX ORDER — PENICILLIN G 3000000 [IU]/50ML
3 INJECTION, SOLUTION INTRAVENOUS EVERY 4 HOURS
Status: DISCONTINUED | OUTPATIENT
Start: 2024-01-03 | End: 2024-01-03

## 2024-01-03 RX ORDER — ONDANSETRON HYDROCHLORIDE 2 MG/ML
4 INJECTION, SOLUTION INTRAVENOUS EVERY 6 HOURS PRN
Status: DISCONTINUED | OUTPATIENT
Start: 2024-01-03 | End: 2024-01-03

## 2024-01-03 RX ORDER — METHYLERGONOVINE MALEATE 0.2 MG/ML
0.2 INJECTION INTRAVENOUS ONCE AS NEEDED
Status: DISCONTINUED | OUTPATIENT
Start: 2024-01-03 | End: 2024-01-05 | Stop reason: HOSPADM

## 2024-01-03 RX ORDER — ONDANSETRON 4 MG/1
4 TABLET, FILM COATED ORAL EVERY 6 HOURS PRN
Status: DISCONTINUED | OUTPATIENT
Start: 2024-01-03 | End: 2024-01-05 | Stop reason: HOSPADM

## 2024-01-03 RX ORDER — LABETALOL HYDROCHLORIDE 5 MG/ML
20 INJECTION, SOLUTION INTRAVENOUS ONCE AS NEEDED
Status: DISCONTINUED | OUTPATIENT
Start: 2024-01-03 | End: 2024-01-03

## 2024-01-03 RX ORDER — HYDRALAZINE HYDROCHLORIDE 20 MG/ML
5 INJECTION INTRAMUSCULAR; INTRAVENOUS ONCE AS NEEDED
Status: DISCONTINUED | OUTPATIENT
Start: 2024-01-03 | End: 2024-01-03

## 2024-01-03 RX ORDER — ONDANSETRON 4 MG/1
4 TABLET, FILM COATED ORAL EVERY 6 HOURS PRN
Status: DISCONTINUED | OUTPATIENT
Start: 2024-01-03 | End: 2024-01-03

## 2024-01-03 RX ORDER — TRANEXAMIC ACID 100 MG/ML
1000 INJECTION, SOLUTION INTRAVENOUS ONCE AS NEEDED
Status: DISCONTINUED | OUTPATIENT
Start: 2024-01-03 | End: 2024-01-05 | Stop reason: HOSPADM

## 2024-01-03 RX ADMIN — ACETAMINOPHEN 975 MG: 325 TABLET ORAL at 05:08

## 2024-01-03 RX ADMIN — SODIUM CHLORIDE, POTASSIUM CHLORIDE, SODIUM LACTATE AND CALCIUM CHLORIDE 125 ML/HR: 600; 310; 30; 20 INJECTION, SOLUTION INTRAVENOUS at 01:31

## 2024-01-03 RX ADMIN — ACETAMINOPHEN 975 MG: 325 TABLET ORAL at 23:18

## 2024-01-03 RX ADMIN — IBUPROFEN 600 MG: 600 TABLET, FILM COATED ORAL at 23:18

## 2024-01-03 RX ADMIN — ACETAMINOPHEN 975 MG: 325 TABLET ORAL at 17:16

## 2024-01-03 RX ADMIN — IBUPROFEN 600 MG: 600 TABLET, FILM COATED ORAL at 05:08

## 2024-01-03 RX ADMIN — IBUPROFEN 600 MG: 600 TABLET, FILM COATED ORAL at 17:16

## 2024-01-03 RX ADMIN — Medication 60 MILLI-UNITS/MIN: at 03:53

## 2024-01-03 RX ADMIN — SODIUM CHLORIDE, POTASSIUM CHLORIDE, SODIUM LACTATE AND CALCIUM CHLORIDE 500 ML: 600; 310; 30; 20 INJECTION, SOLUTION INTRAVENOUS at 03:19

## 2024-01-03 RX ADMIN — ACETAMINOPHEN 975 MG: 325 TABLET ORAL at 11:11

## 2024-01-03 RX ADMIN — IBUPROFEN 600 MG: 600 TABLET, FILM COATED ORAL at 11:12

## 2024-01-03 RX ADMIN — PENICILLIN G POTASSIUM 5 MILLION UNITS: 5000000 INJECTION, POWDER, FOR SOLUTION INTRAMUSCULAR; INTRAVENOUS at 01:30

## 2024-01-03 SDOH — SOCIAL STABILITY: SOCIAL INSECURITY: HAVE YOU HAD THOUGHTS OF HARMING ANYONE ELSE?: NO

## 2024-01-03 SDOH — HEALTH STABILITY: MENTAL HEALTH: HAVE YOU USED ANY SUBSTANCES (CANABIS, COCAINE, HEROIN, HALLUCINOGENS, INHALANTS, ETC.) IN THE PAST 12 MONTHS?: NO

## 2024-01-03 SDOH — SOCIAL STABILITY: SOCIAL INSECURITY: HAS ANYONE EVER THREATENED TO HURT YOUR FAMILY OR YOUR PETS?: NO

## 2024-01-03 SDOH — SOCIAL STABILITY: SOCIAL INSECURITY: ABUSE SCREEN: ADULT

## 2024-01-03 SDOH — SOCIAL STABILITY: SOCIAL INSECURITY: ARE THERE ANY APPARENT SIGNS OF INJURIES/BEHAVIORS THAT COULD BE RELATED TO ABUSE/NEGLECT?: NO

## 2024-01-03 SDOH — SOCIAL STABILITY: SOCIAL INSECURITY: ARE YOU OR HAVE YOU BEEN THREATENED OR ABUSED PHYSICALLY, EMOTIONALLY, OR SEXUALLY BY ANYONE?: NO

## 2024-01-03 SDOH — SOCIAL STABILITY: SOCIAL INSECURITY: VERBAL ABUSE: DENIES

## 2024-01-03 SDOH — HEALTH STABILITY: MENTAL HEALTH: HAVE YOU USED ANY PRESCRIPTION DRUGS OTHER THAN PRESCRIBED IN THE PAST 12 MONTHS?: NO

## 2024-01-03 SDOH — SOCIAL STABILITY: SOCIAL INSECURITY: DO YOU FEEL ANYONE HAS EXPLOITED OR TAKEN ADVANTAGE OF YOU FINANCIALLY OR OF YOUR PERSONAL PROPERTY?: NO

## 2024-01-03 SDOH — HEALTH STABILITY: MENTAL HEALTH: SUICIDAL BEHAVIOR (LIFETIME): NO

## 2024-01-03 SDOH — SOCIAL STABILITY: SOCIAL INSECURITY: PHYSICAL ABUSE: DENIES

## 2024-01-03 SDOH — HEALTH STABILITY: MENTAL HEALTH: WISH TO BE DEAD (PAST 1 MONTH): NO

## 2024-01-03 SDOH — ECONOMIC STABILITY: HOUSING INSECURITY: DO YOU FEEL UNSAFE GOING BACK TO THE PLACE WHERE YOU ARE LIVING?: NO

## 2024-01-03 SDOH — HEALTH STABILITY: MENTAL HEALTH: NON-SPECIFIC ACTIVE SUICIDAL THOUGHTS (PAST 1 MONTH): NO

## 2024-01-03 SDOH — SOCIAL STABILITY: SOCIAL INSECURITY: DOES ANYONE TRY TO KEEP YOU FROM HAVING/CONTACTING OTHER FRIENDS OR DOING THINGS OUTSIDE YOUR HOME?: NO

## 2024-01-03 ASSESSMENT — PATIENT HEALTH QUESTIONNAIRE - PHQ9
1. LITTLE INTEREST OR PLEASURE IN DOING THINGS: NOT AT ALL
SUM OF ALL RESPONSES TO PHQ9 QUESTIONS 1 & 2: 0
2. FEELING DOWN, DEPRESSED OR HOPELESS: NOT AT ALL

## 2024-01-03 ASSESSMENT — PAIN SCALES - GENERAL
PAINLEVEL_OUTOF10: 0 - NO PAIN
PAINLEVEL_OUTOF10: 7
PAINLEVEL_OUTOF10: 5 - MODERATE PAIN
PAINLEVEL_OUTOF10: 6
PAINLEVEL_OUTOF10: 2
PAINLEVEL_OUTOF10: 3
PAINLEVEL_OUTOF10: 0 - NO PAIN
PAINLEVEL_OUTOF10: 0 - NO PAIN
PAINLEVEL_OUTOF10: 5 - MODERATE PAIN

## 2024-01-03 ASSESSMENT — LIFESTYLE VARIABLES
AUDIT-C TOTAL SCORE: 0
HOW OFTEN DO YOU HAVE A DRINK CONTAINING ALCOHOL: NEVER
HOW MANY STANDARD DRINKS CONTAINING ALCOHOL DO YOU HAVE ON A TYPICAL DAY: PATIENT DOES NOT DRINK
AUDIT-C TOTAL SCORE: 0
HOW OFTEN DO YOU HAVE 6 OR MORE DRINKS ON ONE OCCASION: NEVER
SKIP TO QUESTIONS 9-10: 1

## 2024-01-03 ASSESSMENT — PAIN DESCRIPTION - DESCRIPTORS
DESCRIPTORS: CRAMPING
DESCRIPTORS: CRAMPING

## 2024-01-03 NOTE — LACTATION NOTE
Lactation Consultant Note  Lactation Consultation  Consultant Name: EDWARD Pruitt    Maternal Information  Has mother  before?: Yes  How long did the mother previously breastfeed?: 3 months and 4/5 months (now 3 yrs old and 7 years old)  Infant to breast within first 2 hours of birth?: Yes  Exclusive Pump and Bottle Feed: No    Maternal Assessment       Infant Assessment  Infant Behavior: Deep sleep, Content after feeding    Feeding Assessment  Nutrition Source: Breastmilk  Feeding Method: Nursing at the breast    LATCH TOOL       Breast Pump       Other OB Lactation Tools       Patient Follow-up  Inpatient Lactation Follow-up Needed : Yes    Other OB Lactation Documentation       Recommendations/Summary  Infant is 4 hours old at this consult. She is asleep in mother's arms. Mother reports she is experienced and has been able to latch infant easily so far. She was concerned about milk supply but that she felt reassured when she expressed colostrum with hand expression. Discussed normal expectations for first few days of life including feeding frequency, output, and weight trends. Mother has a pump for home use.

## 2024-01-03 NOTE — L&D DELIVERY NOTE
OB Vaginal Delivery Note    1/3/2024  Nathalie Mendez  33 y.o.    Review the Delivery Report for details.     Gestational Age: 39w6d  /Para:   Labor Complications: None   Estimated Blood Loss:   Delivery Blood Loss  24 0021 - 24 0439      Quantitative Blood Loss - (mL) Hospital Encounter 100 mL    Total  100 mL          Quantitative Blood Loss: 100 mL  Delivery Type: Vaginal, Spontaneous   ROM to Delivery Time: 0h 51m   Sex: Female   Weight: 3310 g    1 Minute 5 Minute 10 Minute   Apgar Totals: 9   9          Andrea Wil [[id number]]      Delivery Providers    Delivering clinician: ADRIANA Alaniz   Provider Role    Mireya Castorena, RN Delivery Nurse    Katherine Mendez, RN Nursery Nurse     Resident               Delivery Details:  Nathalie Mendez, a 33 y.o.  female delivered a viable Female infant with Apgars of 9  and 9 . Patient was fully dilated and pushing after   hours   minutes in active labor. The patient was put in the dorsal lithotomy position. Delivery was via Vaginal, Spontaneous  to a sterile field under None  anesthesia. Infant delivered in Vertex  Right  Occiput  Anterior  position. Anterior and posterior shoulders delivered without difficulty. The cord was clamped twice, cut and 3 vessels  were noted. Cord blood was obtained in routine fashion with the following disposition: Discarded .  Cord complications were None    . Intact  placenta delivered at 1/3/2024  4:00 AM . Placental disposition was discarded . Fundal massage performed and fundus found to be firm. Perineum, vagina, cervix were inspected, and the following lacerations were noted:   Wil Mendez [[id number]]      Lacerations    Other lacerations?: No            Any lacerations were repaired in the usual fashion using  . Excellent hemostasis was noted. Needle count correct. Infant and patient in delivery room in good and stable condition.     ADRIANA Alaniz

## 2024-01-03 NOTE — PROGRESS NOTES
Assessment    33 y.o.  at 39w6d  FHT Category 1  Active labor  Meconium Stained Fluid  GBS +  Sickle Cell Trait  H/o Anxiety       Plan    Initiate continuous monitoring for Meconium Fluid  Expectant management  Encourage frequent position changes as tolerated  Encourage ambulation as tolerated  Continue assessment of maternal and fetal wellbeing  Recheck as clinically indicated by maternal or fetal status  Anticipate NSVB    Brenna Lerma, APRN-KENNETHM    Subjective:  Nathalie Mendez is feeling a lot of pressure. She is uncomfortable with contractions but would still like to go NCB at this time.     Objective:  Fetal Monitoring      Baseline FHR: 140 per minute  Variability: moderate    Cervical Exam:  8 cm dilated, 90 effaced, -1 station    Membrane status: ruptured, meconium        Vitals:    24 0137 24 0139 24 0234 24 0235   BP:  (!) 141/76 114/74    Pulse: 85 75 100 94   Resp:       Temp:       TempSrc:       SpO2: 99%   99%

## 2024-01-03 NOTE — DISCHARGE INSTRUCTIONS

## 2024-01-03 NOTE — H&P
Obstetrical Admission History and Physical     Nathalie Mendez is a 33 y.o.  at 39w6d. AMADOU: 2024, by Last Menstrual Period. Estimated fetal weight: 7lbs. She has had prenatal care with Caryn WRIGHT .    Chief Complaint: Contractions    Assessment/Plan    33 y.o.  @39.6wks   Active Labor  Sickle Cell Trait   Hx/o Anxiety and Depression  GBS+    Active Problems:  There are no active Hospital Problems.        -Admit to labor and delivery for normal labor  -Monitor vital signs per unit protocol  - Routine labs ordered, T&S, CBC  - Consent obtained  - Encourage frequent position changes  - Intermittent fetal monitoring  - Pain management per patient request  - Continue assessment of maternal and fetal well-being  - Recheck as clinically indicted by maternal or fetal status  - Will evaluate progress of labor as clinically indicated  - Anticipate SVB      Dr. Zacarias aware of admission    ADRIANA Alaniz    Pregnancy Problems (from 23 to present)       Problem Noted Resolved    Supervision of other normal pregnancy, antepartum 2023 by ADRIANA Quigley No    Priority:  Medium      Overview Addendum 2023  3:24 PM by ADRIANA Quigley     Desired provider in labor: [] CNM  [] Physician  [x] Dated by: LMP   [x] Initial BMI: 25  [x] Prenatal Labs: WNL   [x] Rh status: A+  [] Genetic Screening:    [x] Baby ASA: not on     [x] Anatomy US: 8/10/23 WNL  [x] Fetal Sex: female  [] Patient added to BirthTracks  [x] 1hr GCT at 24-28wks: 63    [x] Tdap (27-36wks): 10/23/23  [x] Flu Shot: 10/23/23  [] COVID vaccine:     [x] Breastfeeding  [x] Pain management during labor: natura  [x] Postpartum Birth control method: Paragard   [x] support: FOB, mother-in-law  [x] GBS at 36 wks: positive                Group B streptococcal infection 2023 by ADRIANA Quigley No    Priority:  Medium      Overview Signed 2023  1:36 PM by  ADRIANA Quigley     GBS+   Please treat in labor          Sickle cell trait (CMS/[facility]) 2023 by ADRIANA Quigley No    Priority:  Medium      Overview Addendum 2024  7:01 AM by ADRIANA Quigley     -1st tri urine culture WNL  -3rd tri urine : WNL               Options for delivery have been discussed with the patient and she elects a vaginal delivery.  Labor has been discussed in detail. The risks, benefits, complications, alternatives, expected outcomes, potential problems during recuperation and recovery, and the risks of not performing the procedure were discussed with the patient. The patient stated understanding that the risks of delivery include, but are not limited to: death; reaction to medications; injury to bowel, bladder, ureters, uterus, cervix, vagina, and other pelvic and abdominal structures, infection; blood loss and possible need for transfusion; and potential need for surgery, including hysterectomy. The risks of injury to the infant during delivery were also discussed. All questions were answered. There was concurrence with the planned procedure, and the patient wanted to proceed.    Admit to inpatient status. I anticipate that this patient will require a stay exceeding at least 2 midnights for delivery and postpartum care.  Active management of labor.  Management of pregnancy complications, as indicated.    Subjective   Nathalie is here complaining of q3 min contractions. Good fetal movement. Denies vaginal bleeding., Denies leaking of fluid.     Patient is coping well with contractions. She is planning on going without pain relief medication.      Obstetrical History   OB History    Para Term  AB Living   5 2 2 0 2 2   SAB IAB Ectopic Multiple Live Births   0 2 0 0 2      # Outcome Date GA Lbr Willie/2nd Weight Sex Delivery Anes PTL Lv   5 Current            4 Term 19 40w6d  3289 g M Vag-Spont None N ELLIOT   3 Term 12/20/15  "39w6d  3629 g F Vag-Spont None N ELLIOT   2 IAB  7w0d          1 IAB  7w0d              Past Medical History  Past Medical History:   Diagnosis Date    Abnormal Pap smear of cervix     LGSIL    Hx of gonorrhea     Hx of trichomoniasis     Sickle-cell trait (CMS/[facility]) 2019    Sickle cell trait        Past Surgical History   Past Surgical History:   Procedure Laterality Date    FOOT SURGERY  2016    Foot Repair    OTHER SURGICAL HISTORY  2016    Surgical Treatment For        Social History  Social History     Tobacco Use    Smoking status: Never    Smokeless tobacco: Not on file   Substance Use Topics    Alcohol use: Not Currently     Comment: socially     Substance and Sexual Activity   Drug Use Not Currently    Types: Marijuana    Comment: prior to pregnancy       Allergies  Patient has no known allergies.     Medications  Medications Prior to Admission   Medication Sig Dispense Refill Last Dose    prenat75-iron fum-folic ac-om3 (One Daily Prenatal) -440 mg-mcg-mg combo pack Orally          Objective    Last Vitals  Temp Pulse Resp BP MAP O2 Sat   36.9 °C (98.4 °F) 94 18 114/74   99 %     Physical Examination  GENERAL: Examination reveals a well developed, well nourished, gravid female whose affect is appropriate. She is alert and cooperative.  LUNGS:  appropriate respiratory effort  ABDOMEN: soft, gravid, nontender, nondistended, no abnormal masses, no epigastric pain  FHR is  , with  , and a   tracing.    VAGINA: normal appearing vagina with normal color and discharge and no lesions noted  CERVIX: 6 cm dilated, 80 % effaced, -3 station; MEMBRANES are    PSYCHOLOGICAL: awake and alert; oriented to person, place, and time    Lab Review  No results found for: \"WBC\", \"HGB\", \"HCT\", \"PLT\"    "

## 2024-01-03 NOTE — CARE PLAN
The patient's goals for the shift include      The clinical goals for the shift include good pain control     Patient's vss, assessment stable,pain well controlled, lochia light.

## 2024-01-03 NOTE — SIGNIFICANT EVENT
BP Cuff and Home Monitoring   Patient meets criteria for home monitoring of blood pressure post discharge.  Met with patient to assess for availability of home BP monitor.  Patient does not have access to BP monitor at home.  Pt agreed to order home BP monitor from [email or web address]. BP monitor delivered to room.  Patient educated on how to use BP monitor, recording BP’s on home monitoring log and s/sx to report to her provider.  Pt verbalized understanding the above information.    Large cuff given

## 2024-01-04 ENCOUNTER — PHARMACY VISIT (OUTPATIENT)
Dept: PHARMACY | Facility: CLINIC | Age: 34
End: 2024-01-04
Payer: MEDICAID

## 2024-01-04 PROBLEM — Z34.80 SUPERVISION OF OTHER NORMAL PREGNANCY, ANTEPARTUM (HHS-HCC): Status: RESOLVED | Noted: 2023-12-26 | Resolved: 2024-01-04

## 2024-01-04 PROBLEM — A49.1 GROUP B STREPTOCOCCAL INFECTION: Status: RESOLVED | Noted: 2023-12-26 | Resolved: 2024-01-04

## 2024-01-04 PROCEDURE — 1100000001 HC PRIVATE ROOM DAILY

## 2024-01-04 PROCEDURE — 2500000001 HC RX 250 WO HCPCS SELF ADMINISTERED DRUGS (ALT 637 FOR MEDICARE OP)

## 2024-01-04 PROCEDURE — RXMED WILLOW AMBULATORY MEDICATION CHARGE

## 2024-01-04 RX ORDER — ACETAMINOPHEN 500 MG
1000 TABLET ORAL EVERY 6 HOURS PRN
Qty: 120 TABLET | Refills: 0 | Status: SHIPPED | OUTPATIENT
Start: 2024-01-04

## 2024-01-04 RX ORDER — DOCUSATE SODIUM 100 MG/1
100 CAPSULE, LIQUID FILLED ORAL 2 TIMES DAILY PRN
Qty: 60 CAPSULE | Refills: 0 | Status: SHIPPED | OUTPATIENT
Start: 2024-01-04 | End: 2024-02-03

## 2024-01-04 RX ORDER — IBUPROFEN 600 MG/1
600 TABLET ORAL EVERY 6 HOURS PRN
Qty: 120 TABLET | Refills: 0 | Status: SHIPPED | OUTPATIENT
Start: 2024-01-04 | End: 2024-02-03

## 2024-01-04 RX ADMIN — IBUPROFEN 600 MG: 600 TABLET, FILM COATED ORAL at 17:28

## 2024-01-04 RX ADMIN — ACETAMINOPHEN 975 MG: 325 TABLET ORAL at 11:28

## 2024-01-04 RX ADMIN — IBUPROFEN 600 MG: 600 TABLET, FILM COATED ORAL at 05:34

## 2024-01-04 RX ADMIN — IBUPROFEN 600 MG: 600 TABLET, FILM COATED ORAL at 23:44

## 2024-01-04 RX ADMIN — ACETAMINOPHEN 975 MG: 325 TABLET ORAL at 05:34

## 2024-01-04 RX ADMIN — IBUPROFEN 600 MG: 600 TABLET, FILM COATED ORAL at 11:28

## 2024-01-04 RX ADMIN — ACETAMINOPHEN 975 MG: 325 TABLET ORAL at 17:28

## 2024-01-04 RX ADMIN — ACETAMINOPHEN 975 MG: 325 TABLET ORAL at 23:44

## 2024-01-04 ASSESSMENT — PAIN SCALES - GENERAL
PAINLEVEL_OUTOF10: 0 - NO PAIN

## 2024-01-04 ASSESSMENT — PAIN - FUNCTIONAL ASSESSMENT
PAIN_FUNCTIONAL_ASSESSMENT: 0-10
PAIN_FUNCTIONAL_ASSESSMENT: 0-10

## 2024-01-04 NOTE — CARE PLAN
The patient's goals for the shift include adequate pain control    The clinical goals for the shift include vital signs WNL    Pt vitals and assessments WNL, breastfeeding well, bleeding minimal, denies pain.

## 2024-01-04 NOTE — PROGRESS NOTES
Social Work Assessment       Patient: Nathalie Mendez   Address: [address] [place] [address].  [place] [address]  Phone: [phone]    Referral Reason: History of Depression, Anxiety     Prenatal Care: Yes, [place]     Name: Francisca    : 1/3/24    Other Children: Yes 12/20/15, and 19    Household Composition: Lives with significant other, and dependent children    IPV/DV or Safety Concerns: None reported     Car-Seat: Yes     Safe Sleep Space: Yes   Safe Sleep Education: Yes     Transportation Concerns: Denies     School/Work/Income: [employer]      Insurance: [employer]     Mental Health Diagnoses: Depression, Anxiety   Medication(s): Denies   Counseling: Past     Supports: Significant Other    Substance Use History: Denies     Toxicology Screens: N/A     Department of Children and Family Services (DCFS): N/A       Assessment: SW met with Ms. Mendez at bedside to introduce self, and SW role.  Mother of baby was friendly, and easy to engage in conversation.  She reports living at home with Significant Other, and dependent children.  No safety concerns reported.  Ms. Mendez reports having all supplies needed to care for  including car seat, and safe sleep location.  She verbalized understanding of the ABC's of safe sleep.  Household receives SNAP, and WIC benefits.  She's already linked to [employer].  Community Health Worker, Brandi Shah assisted parent with obtaining a car seat.  Ms. Mendez endorses history of depression, and anxiety.  She received counseling in the past, but no meds.  No history of PPD.  Stable mood currently.  She's aware of signs/symptoms of depression, and able to access mental resources if/when needed.  No other unmet needs identified this visit.  SW will continue to follow, and assist as need.        Plan: Ms. Hernández MR#[id number], and  Francisca MR#[id number] are clear from SW perspective       Signature: Ritika VALLESW

## 2024-01-04 NOTE — LACTATION NOTE
Lactation Consultant Note  Lactation Consultation  Reason for Consult: Follow-up assessment  Consultant Name: Juani Espino RN IBCLC    Maternal Information  Has mother  before?: Yes  How long did the mother previously breastfeed?: a few months with two older children    Maternal Assessment  Breast Assessment: Large, Symmetrical, Soft, Compressible  Nipple Assessment: Erect, Red/inflamed, Sore  Alterations in Nipple Condition: Stage II - abrasions, shallow crack/fissure, stripe  Areola Assessment: Normal    Infant Assessment  Infant Behavior: Light sleep  Infant Assessment: Good cupping of tongue, Tongue protrudes over alveolar ridge, Sublingual frenulum (comment) (suspected tight frenulum noted - will notify peds to evaluate)    Feeding Assessment  Nutrition Source: Breastmilk  Feeding Method: Nursing at the breast  Feeding Position: Cross - cradle, Mother needs assistance with latch/positioning, Skin to skin (minimal assistance required with proper positioning and deepening latch)  Suck/Feeding: Sustained, Coordinated suck/swallow/breathe, Tactile stimulation needed  Latch Assessment: Minimal assistance is needed, Instructed on deep latch, Eagerly grasped on to latch, Deep latch obtained, Optimal angle of mouth opening, Comfortable with no pain, Sucking and swallowing, Sucks with long jaw movement, Bursts of sucking, swallowing, and rest, Flanged lips, Chin moves in rhythmic motion    LATCH TOOL  Latch: Grasps breast, tongue down, lips flanged, rhythmic sucking  Audible Swallowing: Spontaneous and intermittent (24 hours old)  Type of Nipple: Everted (After stimulation)  Comfort (Breast/Nipple): Filling, red/small blisters/bruises, mild/moderate discomfort  Hold (Positioning): Minimal assist, teach one side, mother does other, staff holds  LATCH Score: 8    Breast Pump       Other OB Lactation Tools  Lactation Tools: Lanolin    Patient Follow-up  Inpatient Lactation Follow-up Needed : Yes    Other OB  Lactation Documentation       Recommendations/Summary  Assisted mother with improved positioning and latching infant more deeply to her breast. Mothers nipples appear reddened and are sore. Noted with infant suck assessment that a tight frenulum is suspected. Infant was able to cup her tongue and extend tongue beyond the lower alveolar ridge. Peds was notified to evaluate. Reviewed with mother how to properly provide good support to both her breast and infant when nursing. Maternal breasts are easily expressible. Assisted to position infant to latch using a cross cradle hold. Infant opened widely and was brought up onto mothers breast. Infant held a sustained latch with good rhythmic sucks and swallows observed. Mother reported increased comfort with latch. Some minimal stimulation at times was needed to keep infant interested in feeding. Discussed with mother ways to stimulate infant at breast if infant becomes sleepy and sluggish when nursing. Mother stated that her nipples prior to this feeding appeared sloped and misshapen. Reviewed with mother that her nipple should appear round and elongated when infant releases from the breast to confirm an optimal latch. Educated mother on the importance of both a proper and deep latch for her comfort and effective milk transfer. Instructed mother on how to use her own ebm on her nipples after feeds. Provided mother with lanolin as well. Provided mother with PI sheet #167 on sore and cracked nipples for her reference. Instructed mother to call if further feeding assistance or latch checks are needed.

## 2024-01-04 NOTE — PROGRESS NOTES
Postpartum Progress Note      Assessment/Plan     Nathalie Mendez is a 33 y.o., , who had a Vaginal, Spontaneous   delivery on 1/3/2024  at 39w6d and is now postpartum day 1.    #Postpartum  - continue routine postpartum care  - pain well controlled on po medications  - DVT Score: 5 ; SCDs and enoxaparin prophylactic dose daily while inpatient  - The patient's blood type is A POS.  Rhogam is not indicated.    #Maternal Well-Being  - emotional support provided  - bonding with infant  - Contraception: Undecided     # Feeding  - breastfeeding/pumping encouraged; lactation consult prn    #Dispo  - anticipate d/c on PPD #2 if continuing to meet all postpartum milestones  - for f/u 4-6 weeks with Primary OB provider    Principal Problem:     (normal spontaneous vaginal delivery)       Subjective   Pt returning from bathroom. Denies complaints at this time. Breastfeeding is going well.  Meeting all postpartum milestones- ambulating independently, passing flatus, tolerating PO intake, lochia light, voiding spontaneously, and pain well controlled with PO meds.    Objective   Physical Exam:  General: well appearing, well nourished, postpartum  Obstetric: fundus firm below umbilicus, lochia light  Skin: Warm, dry; no rashes/lesions/erythema  Breast: No masses, nipple discharge  Neuro: A/Ox3, conversational, no gross motor deficit   GI: no distension, appropriately tender, soft, +BS  Respiratory: Even and unlabored on RA, LSCTA BL  Cardiovascular: No BLE edema; No erythema, warmth  Psych: appropriate mood and affect    Last Vitals:  Temp Pulse Resp BP MAP Pulse Ox   36.9 °C (98.4 °F) 73 18 115/72   96 %       Vitals Min/Max Last 24 Hours:  Temp  Min: 36.2 °C (97.2 °F)  Max: 36.9 °C (98.4 °F)  Pulse  Min: 67  Max: 87  Resp  Min: 16  Max: 18  BP  Min: 105/68  Max: 122/81    Lab Data:  Lab Results   Component Value Date    WBC 9.9 2024    HGB 12.7 2024    HCT 37.4 2024     2024

## 2024-01-05 VITALS
RESPIRATION RATE: 16 BRPM | SYSTOLIC BLOOD PRESSURE: 115 MMHG | OXYGEN SATURATION: 97 % | DIASTOLIC BLOOD PRESSURE: 76 MMHG | TEMPERATURE: 98.2 F | HEART RATE: 80 BPM

## 2024-01-05 PROCEDURE — 2500000001 HC RX 250 WO HCPCS SELF ADMINISTERED DRUGS (ALT 637 FOR MEDICARE OP)

## 2024-01-05 RX ADMIN — ACETAMINOPHEN 975 MG: 325 TABLET ORAL at 06:28

## 2024-01-05 RX ADMIN — IBUPROFEN 600 MG: 600 TABLET, FILM COATED ORAL at 06:28

## 2024-01-05 RX ADMIN — ACETAMINOPHEN 975 MG: 325 TABLET ORAL at 12:31

## 2024-01-05 RX ADMIN — IBUPROFEN 600 MG: 600 TABLET, FILM COATED ORAL at 12:31

## 2024-01-05 ASSESSMENT — PAIN SCALES - GENERAL
PAINLEVEL_OUTOF10: 0 - NO PAIN

## 2024-01-05 ASSESSMENT — PAIN - FUNCTIONAL ASSESSMENT: PAIN_FUNCTIONAL_ASSESSMENT: 0-10

## 2024-01-05 NOTE — DISCHARGE SUMMARY
Discharge Summary    Admission Date: 1/3/2024  Discharge Date: 24  Discharge Diagnosis:  (normal spontaneous vaginal delivery)     Patient Active Problem List   Diagnosis    Sickle cell trait (CMS/HCC)     (normal spontaneous vaginal delivery)       Hospital Course  Nathalie Mendez is a 33 y.o.,     Initially presented for: contractions    Admission Date: 1/3/2024    Delivery Date: 1/3/2024  3:53 AM     Delivery type: Vaginal, Spontaneous      GA at delivery: 39w6d    Outcome: Living     Anesthesia during delivery: None     Intrapartum complications: None     Feeding method: Breastfeeding Status: Unknown    Contraception: deferred.    Rhogam: The patient's blood type is A POS.  Rhogam is not indicated.     Now postpartum day: 2.    Hospital course n/f:  PP course uneventful.  Meeting all postpartum milestones- ambulating independently, passing flatus, tolerating PO intake, lochia light, voiding spontaneously, and pain well controlled with PO meds.     Dispo  OK for DC today, patient agreeable.   On discharge, follow up with primary OB,       - 4-6 week for post-partum visit     Pertinent Physical Exam At Time of Discharge  General: well appearing, well nourished, postpartum  Obstetric: fundus firm below umbilicus, lochia light  Skin: Warm, dry; no rashes/lesions/erythema  Breast: No masses, nipple discharge  Neuro: A/Ox3, conversational, no gross motor deficit   GI: no distension, appropriately tender, soft  Respiratory: Even and unlabored on RA  Cardiovascular: No BLE edema; No erythema, warmth  Psych: appropriate mood and affect       Your medication list        START taking these medications        Instructions Last Dose Given Next Dose Due   acetaminophen 500 mg tablet  Commonly known as: Tylenol      Take 2 tablets (1,000 mg) by mouth every 6 hours if needed for moderate pain (4 - 6).       docusate sodium 100 mg capsule  Commonly known as: Colace      Take 1 capsule (100 mg) by mouth 2 times  a day as needed for constipation.        mg tablet  Generic drug: ibuprofen      Take 1 tablet (600 mg) by mouth every 6 hours if needed for moderate pain (4 - 6) (pain).              CONTINUE taking these medications        Instructions Last Dose Given Next Dose Due   One Daily Prenatal -440 mg-mcg-mg combo pack  Generic drug: prenat75-iron fum-folic ac-om3                     Where to Get Your Medications        These medications were sent to [facility]  [address] [place] [address]      Hours: 8:30 AM to 5 PM Mon-Fri Phone: [phone]   acetaminophen 500 mg tablet  docusate sodium 100 mg capsule   mg tablet           Outpatient Follow-Up  Future Appointments   Date Time Provider Department Center   1/22/2024  1:00 PM [facility] [id number] NUTRIT FFL DIET [id number] Academic       Analilia Ybarra PA-C

## 2024-01-05 NOTE — LACTATION NOTE
This note was copied from a baby's chart.  Lactation Consultant Note  Lactation Consultation  Reason for Consult: Follow-up assessment  Consultant Name: WING RhodesCLC    Maternal Information       Maternal Assessment       Infant Assessment       Feeding Assessment       LATCH TOOL       Breast Pump       Other OB Lactation Tools       Patient Follow-up       Other OB Lactation Documentation       Recommendations/Summary    I met briefly with this experienced breastfeeding mother who is preparing for discharge to home. She reports that her infant has been breastfeeding well. She had experienced some nipple discomfort initially, however, it has resolved since meeting with the lactation consultant yesterday. She denies any difficulty with breastfeeding. The mother was given written information on outpatient lactation services and encouraged to call, if needed.

## 2024-01-05 NOTE — CARE PLAN
The patient's goals for the shift include discharge    The clinical goals for the shift include work on breastfeeding    Pt with stable vitals and assessment. Pain being managed with PO pain meds. Pt prepared for discharge today.

## 2024-01-05 NOTE — CARE PLAN
The patient's goals for the shift include adequate pain control    The clinical goals for the shift include vital signs WNL    Patient's vss, assessment stable, pain well controlled, lochia scant.

## 2024-01-08 NOTE — SIGNIFICANT EVENT
Follow-up Phone Call Note:   Interview:  Care Type: Women's Health   Phone Number Call  [phone]   Call Outcome: Connected with patient   Patient Reports Feeling (symptoms) Are: better   Which Meds Were New Meds:  Yes   Which Meds to Continue:  Yes   Which Meds to Stop: no medications were discontinued   Who participated in medication reconciliation with the hospital staff?: you   In your professional opinion do you think there was a medication discrepancy or potential for medication discrepancy in this situation?: no   Medication Issues: no medication issues   Discharge Instructions Clear:   Yes   Patient Has a Primary Care Provider:     Yes   Post-hospitalization Follow-up Occurred According To Schedule:    No   Reason: appointment not scheduled, encouraged patient to call for an appointment    Delivered Baby(ies): Yes   Chest Pain:  No   Shortness of breath or difficulty breathing:  No   Seizures:  No   Any thoughts of hurting yourself or your baby:   No   Bleeding that is soaking through one pad/hour or blood clots the size of an egg or bigger:  No   Incision that is not healing:  No   Red or swollen leg that is painful or warm to the touch:  No   Temperature of 100.4F or higher:  No   Headache that does not get better, even after taking medicine, or a bad headache with vision changes:  No   Where or in what is your baby sleeping?: jeanne   ABC's of sleep covered:  yes   How Are You Feeding Your Baby(ies): breast   Baby Getting Anything Other Than Breastmilk Or Formula For Food:  No   Patient Has Primary Care Provider For Baby(ies): yes   Baby Has Been Seen By a Health Care Provider Since Discharge:  Yes   Which Health Care Provider Saw the Baby: physician office/clinic visit   Mom's Discharge Date: 1/5/24   Date the Baby Was Seen By a Health Care Provider After Discharge: 1/8/24   Patient Has Plan Specific Name And Number Of Who To Call For Concerns:  Yes   Stroke Patient:  No  Comments:    Date/Time Of  Call: 1/8/24 at 1302   Call Back Done By: care coordinator   Callback Complete:  Yes

## 2024-01-30 ENCOUNTER — OFFICE VISIT (OUTPATIENT)
Dept: OBSTETRICS AND GYNECOLOGY | Facility: HOSPITAL | Age: 34
End: 2024-01-30
Payer: COMMERCIAL

## 2024-01-30 VITALS
WEIGHT: 196 LBS | DIASTOLIC BLOOD PRESSURE: 82 MMHG | OXYGEN SATURATION: 99 % | SYSTOLIC BLOOD PRESSURE: 126 MMHG | RESPIRATION RATE: 12 BRPM | BODY MASS INDEX: 31.64 KG/M2

## 2024-01-30 DIAGNOSIS — Z30.02 COUNSELING ON RHYTHM METHOD OF CONTRACEPTION: ICD-10-CM

## 2024-01-30 ASSESSMENT — ENCOUNTER SYMPTOMS
ENDOCRINE NEGATIVE: 0
NEUROLOGICAL NEGATIVE: 0
RESPIRATORY NEGATIVE: 0
ALLERGIC/IMMUNOLOGIC NEGATIVE: 0
CARDIOVASCULAR NEGATIVE: 0
EYES NEGATIVE: 0
MUSCULOSKELETAL NEGATIVE: 0
GASTROINTESTINAL NEGATIVE: 0
PSYCHIATRIC NEGATIVE: 0
HEMATOLOGIC/LYMPHATIC NEGATIVE: 0
CONSTITUTIONAL NEGATIVE: 0

## 2024-01-30 ASSESSMENT — EDINBURGH POSTNATAL DEPRESSION SCALE (EPDS)
TOTAL SCORE: 0
I HAVE BEEN SO UNHAPPY THAT I HAVE BEEN CRYING: NO, NEVER
I HAVE BEEN ANXIOUS OR WORRIED FOR NO GOOD REASON: NO, NOT AT ALL
THE THOUGHT OF HARMING MYSELF HAS OCCURRED TO ME: NEVER
I HAVE BLAMED MYSELF UNNECESSARILY WHEN THINGS WENT WRONG: NO, NEVER
I HAVE LOOKED FORWARD WITH ENJOYMENT TO THINGS: AS MUCH AS I EVER DID
I HAVE BEEN SO UNHAPPY THAT I HAVE HAD DIFFICULTY SLEEPING: NOT AT ALL
I HAVE FELT SCARED OR PANICKY FOR NO GOOD REASON: NO, NOT AT ALL
I HAVE FELT SAD OR MISERABLE: NO, NOT AT ALL
THINGS HAVE BEEN GETTING ON TOP OF ME: NO, I HAVE BEEN COPING AS WELL AS EVER
I HAVE BEEN ABLE TO LAUGH AND SEE THE FUNNY SIDE OF THINGS: AS MUCH AS I ALWAYS COULD

## 2024-01-30 ASSESSMENT — PAIN SCALES - GENERAL: PAINLEVEL: 0-NO PAIN

## 2024-01-30 NOTE — PROGRESS NOTES
Subjective   33 y.o.  presenting for postpartum follow-up.    Delivery Date: 1/3/2024  Gestational Age: 39.6 wks   Type of Delivery: Vaginal, Spontaneous      Pregnancy Problems (from 22 to present)       Problem Noted Resolved     (normal spontaneous vaginal delivery) 1/3/2024 by ADRIANA Alaniz No    Sickle cell trait (CMS/HCC) 2023 by ADRIANA Quigley No    Overview Addendum 2024  7:01 AM by ADRIANA Quigley     -1st tri urine culture WNL  -3rd tri urine : WNL         Supervision of other normal pregnancy, antepartum 2023 by ADRIANA Quigley 2024 by MARTA Ware    Overview Addendum 2023  3:24 PM by ADRIANA Quigley     Desired provider in labor: [] CNM  [] Physician  [x] Dated by: LMP   [x] Initial BMI: 25  [x] Prenatal Labs: WNL   [x] Rh status: A+  [] Genetic Screening:    [x] Baby ASA: not on     [x] Anatomy US: 8/10/23 WNL  [x] Fetal Sex: female  [] Patient added to BirthTracks  [x] 1hr GCT at 24-28wks: 63    [x] Tdap (27-36wks): 10/23/23  [x] Flu Shot: 10/23/23  [] COVID vaccine:     [x] Breastfeeding  [x] Pain management during labor: natura  [x] Postpartum Birth control method: Paragard   [x] support: FOB, mother-in-law  [x] GBS at 36 wks: positive                Group B streptococcal infection 2023 by ADRIANA Quigley 2024 by MARTA Ware    Overview Signed 2023  1:36 PM by ADRIANA Quigley     GBS+   Please treat in labor                  Concerns: none   Lacerations: none  Lochia: slowly - light   Sexual Intimacy: No  Contraceptive Method: None - desires Paragard   Feeding Method: She is breast feeding exclusively. no breast or nursing problems  Lactation Consult Needed?: No    Birth Trauma: No  Bonding with Baby: well with baby girl, Francisca   Mood: OK  Reports mild depression with her son for a short time that  resolved itself - no meds or counseling   Support at home: , mother in law, feels well supported. Denies feeling depression or anxious at this time  Postpartum Depression: Low Risk  (2024)    [place]  Depression Scale     Last EPDS Total Score: 0     Last EPDS Self Harm Result: Never     Last pap: 2020 NIL HPV negative   Physical Exam  Constitutional:       Appearance: Normal appearance.   Genitourinary:      Vulva normal.      Uterus is not tender.      Uterus is anteverted.   HENT:      Head: Normocephalic.   Pulmonary:      Effort: Pulmonary effort is normal.   Abdominal:      Palpations: Abdomen is soft.   Musculoskeletal:         General: Normal range of motion.      Cervical back: Normal range of motion.   Neurological:      General: No focal deficit present.      Mental Status: She is alert and oriented to person, place, and time. Mental status is at baseline.   Skin:     General: Skin is warm and dry.   Psychiatric:         Mood and Affect: Mood normal.         Behavior: Behavior normal.         Thought Content: Thought content normal.        Plan    Advised to call office for breast complaints, abnormal bleeding, mood changes, or other concerning symptoms.   Cleared to resume normal activity as desired  RTC in 2-3 weeks for Paragard IUD placement or prn    Problem List Items Addressed This Visit             ICD-10-CM    Encounter for postpartum visit - Primary Z39.2    Relevant Orders    Follow Up In Gynecology    Counseling on rhythm method of contraception Z30.02   Discussed birth control options including LARC methods (IUD and nexplanon), nuvaring, depo and oral contraception. Reviewed levels of effectiveness, risks/benefits and side effects with mentioned methods.No contraindications identified in patient history. Patient desires Paragard IUD.     Linda Vazquez, BERTHA-CNOKSANA, APRN-CNP

## 2024-02-16 ENCOUNTER — APPOINTMENT (OUTPATIENT)
Dept: OBSTETRICS AND GYNECOLOGY | Facility: HOSPITAL | Age: 34
End: 2024-02-16
Payer: COMMERCIAL

## 2024-03-05 ENCOUNTER — POSTPARTUM VISIT (OUTPATIENT)
Dept: OBSTETRICS AND GYNECOLOGY | Facility: HOSPITAL | Age: 34
End: 2024-03-05
Payer: COMMERCIAL

## 2024-03-05 VITALS
WEIGHT: 195 LBS | BODY MASS INDEX: 31.34 KG/M2 | SYSTOLIC BLOOD PRESSURE: 111 MMHG | DIASTOLIC BLOOD PRESSURE: 74 MMHG | HEIGHT: 66 IN

## 2024-03-05 DIAGNOSIS — Z30.02 COUNSELING ON RHYTHM METHOD OF CONTRACEPTION: ICD-10-CM

## 2024-03-05 DIAGNOSIS — Z30.430 ENCOUNTER FOR INSERTION OF PARAGARD IUD: Primary | ICD-10-CM

## 2024-03-05 LAB — PREGNANCY TEST URINE, POC: NEGATIVE

## 2024-03-05 PROCEDURE — 2500000004 HC RX 250 GENERAL PHARMACY W/ HCPCS (ALT 636 FOR OP/ED): Performed by: ADVANCED PRACTICE MIDWIFE

## 2024-03-05 PROCEDURE — 81025 URINE PREGNANCY TEST: CPT | Performed by: ADVANCED PRACTICE MIDWIFE

## 2024-03-05 PROCEDURE — 58300 INSERT INTRAUTERINE DEVICE: CPT | Performed by: ADVANCED PRACTICE MIDWIFE

## 2024-03-05 RX ADMIN — COPPER 1 EACH: 313.4 INTRAUTERINE DEVICE INTRAUTERINE at 12:27

## 2024-03-05 SDOH — ECONOMIC STABILITY: FOOD INSECURITY: WITHIN THE PAST 12 MONTHS, THE FOOD YOU BOUGHT JUST DIDN'T LAST AND YOU DIDN'T HAVE MONEY TO GET MORE.: NEVER TRUE

## 2024-03-05 SDOH — ECONOMIC STABILITY: FOOD INSECURITY: WITHIN THE PAST 12 MONTHS, YOU WORRIED THAT YOUR FOOD WOULD RUN OUT BEFORE YOU GOT MONEY TO BUY MORE.: NEVER TRUE

## 2024-03-05 ASSESSMENT — ENCOUNTER SYMPTOMS
NEUROLOGICAL NEGATIVE: 0
EYES NEGATIVE: 0
PSYCHIATRIC NEGATIVE: 0
CONSTITUTIONAL NEGATIVE: 0
LOSS OF SENSATION IN FEET: 0
DEPRESSION: 0
HEMATOLOGIC/LYMPHATIC NEGATIVE: 0
ENDOCRINE NEGATIVE: 0
OCCASIONAL FEELINGS OF UNSTEADINESS: 0
GASTROINTESTINAL NEGATIVE: 0
ALLERGIC/IMMUNOLOGIC NEGATIVE: 0
MUSCULOSKELETAL NEGATIVE: 0
RESPIRATORY NEGATIVE: 0
CARDIOVASCULAR NEGATIVE: 0

## 2024-03-05 ASSESSMENT — PATIENT HEALTH QUESTIONNAIRE - PHQ9
1. LITTLE INTEREST OR PLEASURE IN DOING THINGS: NOT AT ALL
2. FEELING DOWN, DEPRESSED OR HOPELESS: NOT AT ALL
SUM OF ALL RESPONSES TO PHQ9 QUESTIONS 1 & 2: 0

## 2024-03-05 NOTE — PROGRESS NOTES
IUD Insertion Procedure Note    Indications: contraception    Procedure Details   Urine pregnancy test was done today and result was negative .  The risks (including infection, bleeding, pain, and uterine perforation) and benefits of the procedure were explained to the patient and Verbal informed consent was obtained.      Procedure: ParaGard (IUD) placement  Cervix cleansed with Betadine. Uterus sounded to  8.5  cm.   Local anesthesia:  None  Tenaculum used:  Yes, single tooth, anterior  IUD inserted without difficulty.   String visible and trimmed to 3 cm.  Patient tolerated procedure well.    Condition:  Stable    Complications:  None    Plan:  The patient was advised to call for any fever or for prolonged or severe pain or bleeding. She was advised to use OTC ibuprofen as needed for mild to moderate pain.   Reviewed expected side effects and bleeding pattern of Paragard IUD.   RTC in 4 weeks for IUD check.     Linda Vazquez, BERTHA-CNOKSANA, APRN-CNP

## 2024-03-06 PROBLEM — Z30.430 ENCOUNTER FOR INSERTION OF PARAGARD IUD: Status: ACTIVE | Noted: 2024-03-06

## 2024-04-12 ENCOUNTER — APPOINTMENT (OUTPATIENT)
Dept: OBSTETRICS AND GYNECOLOGY | Facility: HOSPITAL | Age: 34
End: 2024-04-12
Payer: COMMERCIAL

## 2024-04-30 ENCOUNTER — OFFICE VISIT (OUTPATIENT)
Dept: OBSTETRICS AND GYNECOLOGY | Facility: HOSPITAL | Age: 34
End: 2024-04-30
Payer: COMMERCIAL

## 2024-04-30 VITALS
DIASTOLIC BLOOD PRESSURE: 68 MMHG | HEIGHT: 66 IN | SYSTOLIC BLOOD PRESSURE: 103 MMHG | WEIGHT: 197 LBS | BODY MASS INDEX: 31.66 KG/M2

## 2024-04-30 DIAGNOSIS — Z30.431 IUD CHECK UP: Primary | ICD-10-CM

## 2024-04-30 PROCEDURE — 99213 OFFICE O/P EST LOW 20 MIN: CPT | Performed by: ADVANCED PRACTICE MIDWIFE

## 2024-04-30 RX ORDER — BISMUTH SUBSALICYLATE 262 MG
1 TABLET,CHEWABLE ORAL DAILY
COMMUNITY

## 2024-04-30 ASSESSMENT — PAIN SCALES - GENERAL: PAINLEVEL: 0-NO PAIN

## 2024-04-30 NOTE — PROGRESS NOTES
"Subjective   Nathalie Mendez is a 33 y.o.  who presents for IUD check. Paragard IUD inserted 3/5/24. Happy with IUD - no concerns or issues.     LMP , lasting about 7 days duration     Type of IUD:  ParaGard  Date of insertion:  known  Other relevant history/information:  none    Objective   /68   Ht 1.676 m (5' 6\")   Wt 89.4 kg (197 lb)   LMP 2024   Breastfeeding Yes   BMI 31.80 kg/m²     Physical Exam  Constitutional:       Appearance: Normal appearance.   Genitourinary:      Vulva normal.      No vaginal discharge or tenderness.      IUD strings visualized.   Pulmonary:      Effort: Pulmonary effort is normal.   Musculoskeletal:         General: Normal range of motion.      Cervical back: Normal range of motion.   Neurological:      General: No focal deficit present.      Mental Status: She is alert and oriented to person, place, and time. Mental status is at baseline.   Skin:     General: Skin is warm and dry.   Psychiatric:         Mood and Affect: Mood normal.         Behavior: Behavior normal.         Thought Content: Thought content normal.            Assessment/Plan   Problem List Items Addressed This Visit             ICD-10-CM    IUD check up - Primary Z30.431   IUD in place  Reviewed expected changes to bleeding patterns with Paragard   Pap up to date - due 2025  RTC for annual or prn     Linda Vazquez, BERTHA-CNM, APRN-CNP  "